# Patient Record
Sex: MALE | Race: WHITE | Employment: OTHER
[De-identification: names, ages, dates, MRNs, and addresses within clinical notes are randomized per-mention and may not be internally consistent; named-entity substitution may affect disease eponyms.]

---

## 2017-07-01 ENCOUNTER — HEALTH MAINTENANCE LETTER (OUTPATIENT)
Age: 72
End: 2017-07-01

## 2020-06-22 ENCOUNTER — TRANSFERRED RECORDS (OUTPATIENT)
Dept: HEALTH INFORMATION MANAGEMENT | Facility: CLINIC | Age: 75
End: 2020-06-22

## 2020-07-13 ENCOUNTER — TRANSFERRED RECORDS (OUTPATIENT)
Dept: HEALTH INFORMATION MANAGEMENT | Facility: CLINIC | Age: 75
End: 2020-07-13

## 2020-07-29 ENCOUNTER — HOSPITAL ENCOUNTER (OUTPATIENT)
Dept: INTERVENTIONAL RADIOLOGY/VASCULAR | Facility: CLINIC | Age: 75
End: 2020-07-29
Attending: INTERNAL MEDICINE | Admitting: RADIOLOGY
Payer: MEDICARE

## 2020-07-29 ENCOUNTER — HOSPITAL ENCOUNTER (OUTPATIENT)
Facility: CLINIC | Age: 75
Discharge: HOME | End: 2020-07-29
Attending: RADIOLOGY | Admitting: RADIOLOGY
Payer: MEDICARE

## 2020-07-29 VITALS
SYSTOLIC BLOOD PRESSURE: 128 MMHG | BODY MASS INDEX: 34.88 KG/M2 | DIASTOLIC BLOOD PRESSURE: 66 MMHG | WEIGHT: 249.12 LBS | HEART RATE: 57 BPM | TEMPERATURE: 97.8 F | OXYGEN SATURATION: 96 % | RESPIRATION RATE: 16 BRPM | HEIGHT: 71 IN

## 2020-07-29 DIAGNOSIS — N18.6 ESRD (END STAGE RENAL DISEASE) (H): ICD-10-CM

## 2020-07-29 LAB
ANION GAP SERPL CALCULATED.3IONS-SCNC: 8 MMOL/L (ref 3–14)
BUN SERPL-MCNC: 82 MG/DL (ref 7–30)
CALCIUM SERPL-MCNC: 9.7 MG/DL (ref 8.5–10.1)
CHLORIDE SERPL-SCNC: 108 MMOL/L (ref 94–109)
CO2 SERPL-SCNC: 27 MMOL/L (ref 20–32)
CREAT SERPL-MCNC: 6.28 MG/DL (ref 0.66–1.25)
GFR SERPL CREATININE-BSD FRML MDRD: 8 ML/MIN/{1.73_M2}
GLUCOSE SERPL-MCNC: 61 MG/DL (ref 70–99)
POTASSIUM SERPL-SCNC: 3.6 MMOL/L (ref 3.4–5.3)
SODIUM SERPL-SCNC: 143 MMOL/L (ref 133–144)

## 2020-07-29 PROCEDURE — 25000128 H RX IP 250 OP 636

## 2020-07-29 PROCEDURE — 25000125 ZZHC RX 250: Performed by: RADIOLOGY

## 2020-07-29 PROCEDURE — C1769 GUIDE WIRE: HCPCS

## 2020-07-29 PROCEDURE — 25000128 H RX IP 250 OP 636: Performed by: RADIOLOGY

## 2020-07-29 PROCEDURE — 77001 FLUOROGUIDE FOR VEIN DEVICE: CPT

## 2020-07-29 PROCEDURE — 80048 BASIC METABOLIC PNL TOTAL CA: CPT | Performed by: RADIOLOGY

## 2020-07-29 PROCEDURE — 99152 MOD SED SAME PHYS/QHP 5/>YRS: CPT

## 2020-07-29 PROCEDURE — 36581 REPLACE TUNNELED CV CATH: CPT

## 2020-07-29 PROCEDURE — C1750 CATH, HEMODIALYSIS,LONG-TERM: HCPCS

## 2020-07-29 RX ORDER — FENTANYL CITRATE 50 UG/ML
25-50 INJECTION, SOLUTION INTRAMUSCULAR; INTRAVENOUS EVERY 5 MIN PRN
Status: DISCONTINUED | OUTPATIENT
Start: 2020-07-29 | End: 2020-07-30 | Stop reason: HOSPADM

## 2020-07-29 RX ORDER — HEPARIN SODIUM 1000 [USP'U]/ML
3 INJECTION, SOLUTION INTRAVENOUS; SUBCUTANEOUS ONCE
Status: CANCELLED | OUTPATIENT
Start: 2020-07-29 | End: 2020-07-29

## 2020-07-29 RX ORDER — NICOTINE POLACRILEX 4 MG
15-30 LOZENGE BUCCAL
Status: DISCONTINUED | OUTPATIENT
Start: 2020-07-29 | End: 2020-07-30 | Stop reason: HOSPADM

## 2020-07-29 RX ORDER — DEXTROSE MONOHYDRATE 25 G/50ML
25-50 INJECTION, SOLUTION INTRAVENOUS
Status: DISCONTINUED | OUTPATIENT
Start: 2020-07-29 | End: 2020-07-30 | Stop reason: HOSPADM

## 2020-07-29 RX ORDER — CEFAZOLIN SODIUM 2 G/100ML
INJECTION, SOLUTION INTRAVENOUS
Status: DISCONTINUED
Start: 2020-07-29 | End: 2020-07-29 | Stop reason: HOSPADM

## 2020-07-29 RX ORDER — NALOXONE HYDROCHLORIDE 0.4 MG/ML
.1-.4 INJECTION, SOLUTION INTRAMUSCULAR; INTRAVENOUS; SUBCUTANEOUS
Status: DISCONTINUED | OUTPATIENT
Start: 2020-07-29 | End: 2020-07-30 | Stop reason: HOSPADM

## 2020-07-29 RX ORDER — FENTANYL CITRATE 50 UG/ML
INJECTION, SOLUTION INTRAMUSCULAR; INTRAVENOUS
Status: COMPLETED
Start: 2020-07-29 | End: 2020-07-29

## 2020-07-29 RX ORDER — CEFAZOLIN SODIUM 2 G/100ML
2 INJECTION, SOLUTION INTRAVENOUS
Status: COMPLETED | OUTPATIENT
Start: 2020-07-29 | End: 2020-07-29

## 2020-07-29 RX ORDER — LIDOCAINE HYDROCHLORIDE AND EPINEPHRINE 10; 10 MG/ML; UG/ML
INJECTION, SOLUTION INFILTRATION; PERINEURAL
Status: DISCONTINUED
Start: 2020-07-29 | End: 2020-07-29 | Stop reason: WASHOUT

## 2020-07-29 RX ORDER — LIDOCAINE HYDROCHLORIDE 10 MG/ML
INJECTION, SOLUTION EPIDURAL; INFILTRATION; INTRACAUDAL; PERINEURAL
Status: DISCONTINUED
Start: 2020-07-29 | End: 2020-07-29 | Stop reason: HOSPADM

## 2020-07-29 RX ORDER — HEPARIN SODIUM 1000 [USP'U]/ML
INJECTION, SOLUTION INTRAVENOUS; SUBCUTANEOUS
Status: COMPLETED
Start: 2020-07-29 | End: 2020-07-29

## 2020-07-29 RX ORDER — FLUMAZENIL 0.1 MG/ML
0.2 INJECTION, SOLUTION INTRAVENOUS
Status: DISCONTINUED | OUTPATIENT
Start: 2020-07-29 | End: 2020-07-30 | Stop reason: HOSPADM

## 2020-07-29 RX ORDER — DEXTROSE MONOHYDRATE 25 G/50ML
25-50 INJECTION, SOLUTION INTRAVENOUS
Status: CANCELLED | OUTPATIENT
Start: 2020-07-29

## 2020-07-29 RX ORDER — NICOTINE POLACRILEX 4 MG
15-30 LOZENGE BUCCAL
Status: CANCELLED | OUTPATIENT
Start: 2020-07-29

## 2020-07-29 RX ADMIN — CEFAZOLIN SODIUM 2 G: 2 INJECTION, SOLUTION INTRAVENOUS at 08:20

## 2020-07-29 RX ADMIN — LIDOCAINE HYDROCHLORIDE 4 ML: 10 INJECTION, SOLUTION EPIDURAL; INFILTRATION; INTRACAUDAL; PERINEURAL at 08:47

## 2020-07-29 RX ADMIN — FENTANYL CITRATE 100 MCG: 50 INJECTION, SOLUTION INTRAMUSCULAR; INTRAVENOUS at 08:39

## 2020-07-29 RX ADMIN — HEPARIN SODIUM 5000 UNITS: 1000 INJECTION INTRAVENOUS; SUBCUTANEOUS at 08:45

## 2020-07-29 RX ADMIN — MIDAZOLAM 1 MG: 1 INJECTION INTRAMUSCULAR; INTRAVENOUS at 08:29

## 2020-07-29 ASSESSMENT — MIFFLIN-ST. JEOR: SCORE: 1897.13

## 2020-07-29 NOTE — DISCHARGE INSTRUCTIONS
Invasive Radiology Procedures Discharge Instructions         _____________________________________________________________________    Patient Name:  Alex Flores  Today's Date:  July 29, 2020    The doctor who did your procedure today was Dr. Jean.    Procedure:  Stent/Tube Exchange    If you have not received your results after 5 days, please call the doctor who ordered your test.  Diet and medicines    Go back to your normal diet.    You may start taking your normal medicines again (including Coumadin, or warfarin), as shown on your medicine sheet.    If you take aspirin, Plavix or other anti-platelet drugs: Start taking it tomorrow.    If take Coumadin (warfarin): Ask your doctor when to have your INR checked.    For minor pain, you may take Tylenol (acetaminophen) or Advil (ibuprofen).    Activity and puncture site     You may go back to normal activity in 24 hours. Wait 48 hours before lifting,   straining, exercise or other strenuous activity.    For the next day or two, check your puncture site often while you are awake.    Change the Band-Aid or bandage tomorrow.    You may shower tomorrow morning. No bathing or swimming until your   puncture site has fully healed.    If you received IV medicine to sedate you:  You were given: Versed  and Fentanyl  You may feel drowsy, forgetful or unsteady. For the next 24 hours, do not drive, drink alcohol or make any important decisions.    Know when to call for help  Call your doctor if you have:    A fever greater over 101 F (38.3 C), taken under the tongue.    A lot of bleeding or swelling at your puncture site.    Pain that is getting worse.     Shortness of breath.  Call 911 or go the emergency room if you have:    Severe chest pain or trouble breathing.    A tube that falls out.     Increased blood in your sputum (phlegm).    Bleeding that you cannot control.   Important phone numbers:  [facility] at [phone]

## 2020-07-29 NOTE — PRE-PROCEDURE
GENERAL PRE-PROCEDURE:   Procedure:  Tunneled catheter placement.   Date/Time:  7/29/2020 8:11 AM    Verbal consent obtained?: Yes    Written consent obtained?: Yes    Risks and benefits: Risks, benefits and alternatives were discussed    Consent given by:  Patient  Patient states understanding of procedure being performed: Yes    Patient's understanding of procedure matches consent: Yes    Procedure consent matches procedure scheduled: Yes    Expected level of sedation:  Moderate  Appropriately NPO:  Yes  ASA Class:  Class 2- mild systemic disease, no acute problems, no functional limitations  Mallampati  :  Grade 2- soft palate, base of uvula, tonsillar pillars, and portion of posterior pharyngeal wall visible  Lungs:  Lungs clear with good breath sounds bilaterally  Heart:  Normal heart sounds and rate  History & Physical reviewed:  History and physical reviewed and no updates needed  Statement of review:  I have reviewed the lab findings, diagnostic data, medications, and the plan for sedation

## 2020-07-29 NOTE — PROCEDURES
[facility]    Procedure: Tunneled catheter exchange.    Date/Time: 7/29/2020 9:58 AM  Performed by: Katlyn Jean DO  Authorized by: Katlyn Jean DO     UNIVERSAL PROTOCOL   Site Marked: NA  Prior Images Obtained and Reviewed:  Yes  Required items: Required blood products, implants, devices and special equipment available    Patient identity confirmed:  Verbally with patient, arm band, provided demographic data and hospital-assigned identification number  Patient was reevaluated immediately before administering moderate or deep sedation or anesthesia  Confirmation Checklist:  Patient's identity using two indicators, relevant allergies, procedure was appropriate and matched the consent or emergent situation and correct equipment/implants were available  Time out: Immediately prior to the procedure a time out was called    Universal Protocol: the Joint Commission Universal Protocol was followed    Preparation: Patient was prepped and draped in usual sterile fashion           ANESTHESIA    Anesthesia: Local infiltration  Local Anesthetic:  Lidocaine 1% without epinephrine      SEDATION    Patient Sedated: Yes    Sedation Type:  Moderate (conscious) sedation  Vital signs: Vital signs monitored during sedation    See dictated procedure note for full details.  Findings: Right internal jugular tunneled catheter exchange.    Specimens: none    Complications: None    Condition: Stable    Plan: Ok to use.    PROCEDURE   Patient Tolerance:  Patient tolerated the procedure well with no immediate complications    Length of time physician/provider present for 1:1 monitoring during sedation: 20

## 2020-07-29 NOTE — IP AVS SNAPSHOT
MRN:[id number]                      After Visit Summary   7/29/2020    Alex Flores    MRN: [id number]           Visit Information        Provider Department      7/29/2020  8:00 AM  IR RAD; [facility]; [facility] Interventional Radiology           Review of your medicines      Notice    This visit is during an admission. Changes to the med list made in this visit will be reflected in the After Visit Summary of the admission.           Protect others around you: Learn how to safely use, store and throw away your medicines at www.disposemymeds.org.       Follow-ups after your visit       Care Instructions       Further instructions from your care team         Invasive Radiology Procedures Discharge Instructions         _____________________________________________________________________    Patient Name:  Alex Flores  Today's Date:  July 29, 2020    The doctor who did your procedure today was Dr. Jean.    Procedure:  Stent/Tube Exchange    If you have not received your results after 5 days, please call the doctor who ordered your test.  Diet and medicines    Go back to your normal diet.    You may start taking your normal medicines again (including Coumadin, or warfarin), as shown on your medicine sheet.    If you take aspirin, Plavix or other anti-platelet drugs: Start taking it tomorrow.    If take Coumadin (warfarin): Ask your doctor when to have your INR checked.    For minor pain, you may take Tylenol (acetaminophen) or Advil (ibuprofen).    Activity and puncture site     You may go back to normal activity in 24 hours. Wait 48 hours before lifting,   straining, exercise or other strenuous activity.    For the next day or two, check your puncture site often while you are awake.    Change the Band-Aid or bandage tomorrow.    You may shower tomorrow morning. No bathing or swimming until your   puncture site has fully healed.    If you received IV medicine to sedate you:  " You were given: Versed  and Fentanyl  You may feel drowsy, forgetful or unsteady. For the next 24 hours, do not drive, drink alcohol or make any important decisions.    Know when to call for help  Call your doctor if you have:    A fever greater over 101 F (38.3 C), taken under the tongue.    A lot of bleeding or swelling at your puncture site.    Pain that is getting worse.     Shortness of breath.  Call 911 or go the emergency room if you have:    Severe chest pain or trouble breathing.    A tube that falls out.     Increased blood in your sputum (phlegm).    Bleeding that you cannot control.   Important phone numbers:  [facility] at [phone]    Additional Information About Your Visit       [employer]erry[employer] Information    [employer] lets you send messages to your doctor, view your test results, renew your prescriptions, schedule appointments and more. To sign up, go to [email or web address] . Click on \"Log in\" on the left side of the screen, which will take you to the Welcome page. Then click on \"Sign up Now\" on the right side of the page.     You will be asked to enter the access code listed below, as well as some personal information. Please follow the directions to create your username and password.     Your access code is: [email or web address]  Expires: 2020  9:06 AM     Your access code will  in 60 days. If you need help or a new code, please call your   [facility] clinic or [phone].       Care EveryWhere ID    This is your Care EveryWhere ID. This could be used by other organizations to access your [place] medical records  [id number]       Your Vitals Were  Most recent update: 2020  9:05 AM    Blood Pressure   128/66 (BP Location: Left arm)          Pulse   57          Temperature   97.8  F (36.6  C) (Temporal)          Respirations   16          Height   1.803 m (5' 11\")             Weight   113 kg (249 lb 1.9 oz)    Pulse Oximetry   96%    BMI (Body Mass Index)   34.75 " kg/m           Primary Care Provider Office Phone # Fax #    Isai Young [phone]      Equal Access to Services    ZURIMELISA CRISSY : Jayla aad ku hadnessamargaret Lazar, amaliakaren gomez, hugomerline christianclarence blaynejose, srinivas maxwellteresa cisnerosmario castro latrishteresa whitney. So [facility] [phone].    ATENCIÓN: Si habla español, tiene a gaviria disposición servicios gratuitos de asistencia lingüística. Llame al [phone].    We comply with applicable federal and state civil rights laws, including the [place] Human Rights Act. We do not discriminate on the basis of race, color, creed, Amish, national origin, marital status, age, disability, sex, sexual orientation, or gender identity.       Thank you!    Thank you for choosing [facility] for your care. Our goal is always to provide you with excellent care. Hearing back from our patients is one way we can continue to improve our services. Please take a few minutes to complete the written survey that you may receive in the mail after you visit. If you would like to speak to someone directly about your visit please contact Patient Relations at [phone]. Thank you!           Medication List     Notice    This visit is during an admission. Changes to the med list made in this visit will be reflected in the After Visit Summary of the admission.

## 2020-07-29 NOTE — IP AVS SNAPSHOT
[facility] Interventional Radiology  [address]  [facility] [address]  Phone:  [phone]  Fax:  [phone]                                    After Visit Summary   7/29/2020    Alex Flores    MRN: [id number]           After Visit Summary Signature Page    I have received my discharge instructions, and my questions have been answered. I have discussed any challenges I see with this plan with the nurse or doctor.    ..........................................................................................................................................  Patient/Patient Representative Signature      ..........................................................................................................................................  Patient Representative Print Name and Relationship to Patient    ..................................................               ................................................  Date                                   Time    ..........................................................................................................................................  Reviewed by Signature/Title    ...................................................              ..............................................  Date                                               Time          22EPIC Rev 08/18

## 2020-07-29 NOTE — PROGRESS NOTES
Pt able to ambulate and dress self.  Pt consumed two packs of cookies and two apple juices without N/V or difficulty swallowing.  Pierre score returned to baseline.  Pt escorted to his ride home (brother) and plans to attend dialysis later this morning.